# Patient Record
(demographics unavailable — no encounter records)

---

## 2024-11-06 NOTE — ASSESSMENT
[FreeTextEntry1] : 60-year-old male HL, presents for follow up. Was in Rehab for alcohol addiction.  Seen on 10/3/2023 for elevated PSA = 4.5 / %free=11%TP fusion Biopsy: Gl 7 (3+4) 4/4 cores in target / 4 cores of Gl 6 / 1 core of Gl 7 (4+3) with >50% pattern 4.  ++ PNI June 2024: decision regarding tx - patient does not want to do ADT - he prefers surgery July 2024: s/p RALP (7/1/24) - nerve sparing - path: Gl 7 (4+3) - 80% of pattern 4 - negative margins - 0/9 LN's - [id number] Nov 2024: PSA=0.02 / Continence: 3 pads per day  plan: pT3a PCa  PSA in 6 months referral PFPT again rtc 6 months

## 2025-05-04 NOTE — HISTORY OF PRESENT ILLNESS
[FreeTextEntry1] : 61-year-old male HL presents for follow up. Was in Rehab for alcohol addiction 4 months ago. now out of rehab and is clean. Lives at home.  Seen on 10/3/2023 for elevated PSA = 4.5 / %free=11% Family history of Prostate cancer: Father.  Reported reasonable stream, urinates 3 to 4 x during the day and nocturia of 0-1 x. Rated erections 4/5. Able to attain, maintain and penetrate. Normal libido and ejaculation.  MRI prostate: PS=25 cc / PIRADS 4 lesion right PL apex peripheral zone / no NVB/LN/SV involvement March 2024: TP fusion Biopsy: Gl 7 (3+4) 4/4 cores in target / 4 cores of Gl 6 / 1 core of Gl 7 (4+3) with >50% pattern 4.  ++ PNI June 2024: decision regarding tx - patient does not want to do ADT - he prefers surgery July 2024: s/p RALP (7/1/24) - nerve sparing - path: Gl 7 (4+3) - 80% of pattern 4 - negative margins - 0/9 LN's - [id number] Nov 2024: PSA=0.02 / Continence: 3 pads per day - never underwent PFPT May 2025: 6 month post RALP PSA = ?